# Patient Record
Sex: FEMALE | NOT HISPANIC OR LATINO | URBAN - METROPOLITAN AREA
[De-identification: names, ages, dates, MRNs, and addresses within clinical notes are randomized per-mention and may not be internally consistent; named-entity substitution may affect disease eponyms.]

---

## 2019-01-09 ENCOUNTER — APPOINTMENT (RX ONLY)
Dept: URBAN - METROPOLITAN AREA CLINIC 39 | Facility: CLINIC | Age: 69
Setting detail: DERMATOLOGY
End: 2019-01-09

## 2019-01-09 DIAGNOSIS — L82.1 OTHER SEBORRHEIC KERATOSIS: ICD-10-CM

## 2019-01-09 DIAGNOSIS — I83.9 ASYMPTOMATIC VARICOSE VEINS OF LOWER EXTREMITIES: ICD-10-CM

## 2019-01-09 PROBLEM — I83.91 ASYMPTOMATIC VARICOSE VEINS OF RIGHT LOWER EXTREMITY: Status: ACTIVE | Noted: 2019-01-09

## 2019-01-09 PROCEDURE — 99203 OFFICE O/P NEW LOW 30 MIN: CPT

## 2022-08-25 ENCOUNTER — APPOINTMENT (RX ONLY)
Dept: URBAN - METROPOLITAN AREA CLINIC 53 | Facility: CLINIC | Age: 72
Setting detail: DERMATOLOGY
End: 2022-08-25

## 2022-08-25 DIAGNOSIS — B35.1 TINEA UNGUIUM: ICD-10-CM

## 2022-08-25 DIAGNOSIS — L73.8 OTHER SPECIFIED FOLLICULAR DISORDERS: ICD-10-CM

## 2022-08-25 DIAGNOSIS — L81.4 OTHER MELANIN HYPERPIGMENTATION: ICD-10-CM

## 2022-08-25 DIAGNOSIS — L82.1 OTHER SEBORRHEIC KERATOSIS: ICD-10-CM

## 2022-08-25 DIAGNOSIS — D22 MELANOCYTIC NEVI: ICD-10-CM

## 2022-08-25 DIAGNOSIS — L30.8 OTHER SPECIFIED DERMATITIS: ICD-10-CM | Status: INADEQUATELY CONTROLLED

## 2022-08-25 PROBLEM — D22.5 MELANOCYTIC NEVI OF TRUNK: Status: ACTIVE | Noted: 2022-08-25

## 2022-08-25 PROCEDURE — 99204 OFFICE O/P NEW MOD 45 MIN: CPT

## 2022-08-25 PROCEDURE — ? COUNSELING

## 2022-08-25 PROCEDURE — ? PRESCRIPTION

## 2022-08-25 PROCEDURE — ? SUNSCREEN RECOMMENDATIONS

## 2022-08-25 PROCEDURE — ? FULL BODY SKIN EXAM

## 2022-08-25 RX ORDER — CLOBETASOL PROPIONATE 0.5 MG/G
CREAM TOPICAL BID
Qty: 60 | Refills: 6 | Status: ERX | COMMUNITY
Start: 2022-08-25

## 2022-08-25 RX ADMIN — CLOBETASOL PROPIONATE: 0.5 CREAM TOPICAL at 00:00

## 2022-08-25 ASSESSMENT — LOCATION DETAILED DESCRIPTION DERM
LOCATION DETAILED: RIGHT SUPERIOR UPPER BACK
LOCATION DETAILED: LEFT 2ND TOENAIL
LOCATION DETAILED: LEFT ANTERIOR SHOULDER
LOCATION DETAILED: LEFT 3RD TOENAIL
LOCATION DETAILED: LEFT POSTERIOR SHOULDER
LOCATION DETAILED: RIGHT SUPERIOR LATERAL UPPER BACK
LOCATION DETAILED: LEFT RADIAL PALM
LOCATION DETAILED: LEFT 4TH TOENAIL
LOCATION DETAILED: LEFT GREAT TOENAIL
LOCATION DETAILED: RIGHT ULNAR PALM
LOCATION DETAILED: LEFT SUPERIOR UPPER BACK
LOCATION DETAILED: LEFT MEDIAL SUPERIOR CHEST
LOCATION DETAILED: RIGHT MID-UPPER BACK

## 2022-08-25 ASSESSMENT — LOCATION SIMPLE DESCRIPTION DERM
LOCATION SIMPLE: RIGHT HAND
LOCATION SIMPLE: LEFT UPPER BACK
LOCATION SIMPLE: LEFT 2ND TOE
LOCATION SIMPLE: LEFT SHOULDER
LOCATION SIMPLE: RIGHT UPPER BACK
LOCATION SIMPLE: LEFT 3RD TOE
LOCATION SIMPLE: RIGHT BACK
LOCATION SIMPLE: LEFT GREAT TOE
LOCATION SIMPLE: LEFT 4TH TOE
LOCATION SIMPLE: LEFT HAND
LOCATION SIMPLE: CHEST

## 2022-08-25 ASSESSMENT — LOCATION ZONE DERM
LOCATION ZONE: TRUNK
LOCATION ZONE: TOENAIL
LOCATION ZONE: HAND
LOCATION ZONE: ARM

## 2022-10-11 ENCOUNTER — APPOINTMENT (RX ONLY)
Dept: URBAN - METROPOLITAN AREA CLINIC 53 | Facility: CLINIC | Age: 72
Setting detail: DERMATOLOGY
End: 2022-10-11

## 2022-10-11 DIAGNOSIS — L40.0 PSORIASIS VULGARIS: ICD-10-CM | Status: INADEQUATELY CONTROLLED

## 2022-10-11 PROCEDURE — ? COUNSELING

## 2022-10-11 PROCEDURE — ? SKYRIZI INITIATION

## 2022-10-11 PROCEDURE — ? ORDER TESTS

## 2022-10-11 PROCEDURE — 99213 OFFICE O/P EST LOW 20 MIN: CPT

## 2022-10-11 ASSESSMENT — LOCATION SIMPLE DESCRIPTION DERM
LOCATION SIMPLE: RIGHT ELBOW
LOCATION SIMPLE: LEFT HAND
LOCATION SIMPLE: LEFT FOREARM
LOCATION SIMPLE: RIGHT PLANTAR SURFACE
LOCATION SIMPLE: LEFT PLANTAR SURFACE
LOCATION SIMPLE: RIGHT HAND

## 2022-10-11 ASSESSMENT — LOCATION ZONE DERM
LOCATION ZONE: HAND
LOCATION ZONE: FEET
LOCATION ZONE: ARM

## 2022-10-11 ASSESSMENT — PGA PSORIASIS: PGA PSORIASIS 2020: MODERATE

## 2022-10-11 ASSESSMENT — LOCATION DETAILED DESCRIPTION DERM
LOCATION DETAILED: LEFT PLANTAR FOREFOOT OVERLYING 4TH METATARSAL
LOCATION DETAILED: RIGHT HYPOTHENAR EMINENCE
LOCATION DETAILED: LEFT PROXIMAL DORSAL FOREARM
LOCATION DETAILED: RIGHT ELBOW
LOCATION DETAILED: RIGHT MEDIAL PLANTAR MIDFOOT
LOCATION DETAILED: LEFT THENAR EMINENCE

## 2022-10-11 ASSESSMENT — BSA PSORIASIS: % BODY COVERED IN PSORIASIS: 10

## 2022-10-11 NOTE — PROCEDURE: SKYRIZI INITIATION
Diagnosis (Required): Psoriasis
Detail Level: Zone
Add Pregnancy And Lactation Warning To Medication Counseling?: No
Skyrizi Dosing: 150 mg SC week 0 and week 4 then every 12 weeks thereafter
Pregnancy And Lactation Warning Text: The risk during pregnancy and breastfeeding is uncertain with this medication.
Skyrizi Monitoring Guidelines: A yearly test for tuberculosis is required while taking Skyrizi.

## 2022-10-11 NOTE — PROCEDURE: ORDER TESTS
Performing Laboratory: 0
Billing Type: Third-Party Bill
Expected Date Of Service: 10/11/2022
Bill For Surgical Tray: no

## 2022-10-18 ENCOUNTER — APPOINTMENT (RX ONLY)
Dept: URBAN - METROPOLITAN AREA CLINIC 53 | Facility: CLINIC | Age: 72
Setting detail: DERMATOLOGY
End: 2022-10-18

## 2022-10-18 DIAGNOSIS — L40.0 PSORIASIS VULGARIS: ICD-10-CM

## 2022-10-18 DIAGNOSIS — L29.8 OTHER PRURITUS: ICD-10-CM

## 2022-10-18 DIAGNOSIS — L29.89 OTHER PRURITUS: ICD-10-CM

## 2022-10-18 PROCEDURE — ? PRESCRIPTION

## 2022-10-18 RX ORDER — CLOBETASOL PROPIONATE 0.5 MG/G
CREAM TOPICAL BID
Qty: 60 | Refills: 11 | Status: ERX

## 2022-10-18 RX ORDER — PREDNISONE 10 MG/1
TABLET ORAL
Qty: 1 | Refills: 0 | Status: ERX | COMMUNITY
Start: 2022-10-18

## 2022-10-18 RX ADMIN — PREDNISONE: 10 TABLET ORAL at 00:00
